# Patient Record
Sex: MALE | URBAN - METROPOLITAN AREA
[De-identification: names, ages, dates, MRNs, and addresses within clinical notes are randomized per-mention and may not be internally consistent; named-entity substitution may affect disease eponyms.]

---

## 2020-12-22 ENCOUNTER — INPATIENT (INPATIENT)
Facility: HOSPITAL | Age: 45
LOS: 0 days | Discharge: HOME | DRG: 287 | End: 2020-12-23
Attending: FAMILY MEDICINE | Admitting: INTERNAL MEDICINE
Payer: SELF-PAY

## 2020-12-22 VITALS — WEIGHT: 240.08 LBS | HEIGHT: 76 IN

## 2020-12-22 DIAGNOSIS — I21.3 ST ELEVATION (STEMI) MYOCARDIAL INFARCTION OF UNSPECIFIED SITE: ICD-10-CM

## 2020-12-22 DIAGNOSIS — R94.31 ABNORMAL ELECTROCARDIOGRAM [ECG] [EKG]: ICD-10-CM

## 2020-12-22 LAB
ADD ON TEST-SPECIMEN IN LAB: SIGNIFICANT CHANGE UP
ALBUMIN SERPL ELPH-MCNC: 3.8 G/DL — SIGNIFICANT CHANGE UP (ref 3.3–5)
ALP SERPL-CCNC: 68 U/L — SIGNIFICANT CHANGE UP (ref 40–120)
ALT FLD-CCNC: 26 U/L — SIGNIFICANT CHANGE UP (ref 12–78)
ANION GAP SERPL CALC-SCNC: 6 MMOL/L — SIGNIFICANT CHANGE UP (ref 5–17)
AST SERPL-CCNC: 17 U/L — SIGNIFICANT CHANGE UP (ref 15–37)
BASOPHILS # BLD AUTO: 0.04 K/UL — SIGNIFICANT CHANGE UP (ref 0–0.2)
BASOPHILS NFR BLD AUTO: 0.5 % — SIGNIFICANT CHANGE UP (ref 0–2)
BILIRUB SERPL-MCNC: 0.7 MG/DL — SIGNIFICANT CHANGE UP (ref 0.2–1.2)
BUN SERPL-MCNC: 14 MG/DL — SIGNIFICANT CHANGE UP (ref 7–23)
CALCIUM SERPL-MCNC: 9.1 MG/DL — SIGNIFICANT CHANGE UP (ref 8.5–10.1)
CHLORIDE SERPL-SCNC: 106 MMOL/L — SIGNIFICANT CHANGE UP (ref 96–108)
CO2 SERPL-SCNC: 27 MMOL/L — SIGNIFICANT CHANGE UP (ref 22–31)
CREAT SERPL-MCNC: 1.46 MG/DL — HIGH (ref 0.5–1.3)
D DIMER BLD IA.RAPID-MCNC: <150 NG/ML DDU — SIGNIFICANT CHANGE UP
EOSINOPHIL # BLD AUTO: 0.1 K/UL — SIGNIFICANT CHANGE UP (ref 0–0.5)
EOSINOPHIL NFR BLD AUTO: 1.1 % — SIGNIFICANT CHANGE UP (ref 0–6)
GLUCOSE SERPL-MCNC: 87 MG/DL — SIGNIFICANT CHANGE UP (ref 70–99)
HCT VFR BLD CALC: 45.1 % — SIGNIFICANT CHANGE UP (ref 39–50)
HGB BLD-MCNC: 15.4 G/DL — SIGNIFICANT CHANGE UP (ref 13–17)
IMM GRANULOCYTES NFR BLD AUTO: 0.3 % — SIGNIFICANT CHANGE UP (ref 0–1.5)
LIDOCAIN IGE QN: 181 U/L — SIGNIFICANT CHANGE UP (ref 73–393)
LYMPHOCYTES # BLD AUTO: 3.48 K/UL — HIGH (ref 1–3.3)
LYMPHOCYTES # BLD AUTO: 39.3 % — SIGNIFICANT CHANGE UP (ref 13–44)
MAGNESIUM SERPL-MCNC: 2.2 MG/DL — SIGNIFICANT CHANGE UP (ref 1.6–2.6)
MCHC RBC-ENTMCNC: 25.4 PG — LOW (ref 27–34)
MCHC RBC-ENTMCNC: 34.1 GM/DL — SIGNIFICANT CHANGE UP (ref 32–36)
MCV RBC AUTO: 74.3 FL — LOW (ref 80–100)
MONOCYTES # BLD AUTO: 0.88 K/UL — SIGNIFICANT CHANGE UP (ref 0–0.9)
MONOCYTES NFR BLD AUTO: 9.9 % — SIGNIFICANT CHANGE UP (ref 2–14)
NEUTROPHILS # BLD AUTO: 4.32 K/UL — SIGNIFICANT CHANGE UP (ref 1.8–7.4)
NEUTROPHILS NFR BLD AUTO: 48.9 % — SIGNIFICANT CHANGE UP (ref 43–77)
NT-PROBNP SERPL-SCNC: 30 PG/ML — SIGNIFICANT CHANGE UP (ref 0–125)
PLATELET # BLD AUTO: 147 K/UL — LOW (ref 150–400)
POTASSIUM SERPL-MCNC: 3.7 MMOL/L — SIGNIFICANT CHANGE UP (ref 3.5–5.3)
POTASSIUM SERPL-SCNC: 3.7 MMOL/L — SIGNIFICANT CHANGE UP (ref 3.5–5.3)
PROT SERPL-MCNC: 8.1 GM/DL — SIGNIFICANT CHANGE UP (ref 6–8.3)
RBC # BLD: 6.07 M/UL — HIGH (ref 4.2–5.8)
RBC # FLD: 13.5 % — SIGNIFICANT CHANGE UP (ref 10.3–14.5)
SARS-COV-2 RNA SPEC QL NAA+PROBE: SIGNIFICANT CHANGE UP
SODIUM SERPL-SCNC: 139 MMOL/L — SIGNIFICANT CHANGE UP (ref 135–145)
TROPONIN I SERPL-MCNC: <0.015 NG/ML — SIGNIFICANT CHANGE UP (ref 0.01–0.04)
WBC # BLD: 8.85 K/UL — SIGNIFICANT CHANGE UP (ref 3.8–10.5)
WBC # FLD AUTO: 8.85 K/UL — SIGNIFICANT CHANGE UP (ref 3.8–10.5)

## 2020-12-22 PROCEDURE — 71045 X-RAY EXAM CHEST 1 VIEW: CPT

## 2020-12-22 PROCEDURE — 80061 LIPID PANEL: CPT

## 2020-12-22 PROCEDURE — 71045 X-RAY EXAM CHEST 1 VIEW: CPT | Mod: 26

## 2020-12-22 PROCEDURE — 84484 ASSAY OF TROPONIN QUANT: CPT

## 2020-12-22 PROCEDURE — 85379 FIBRIN DEGRADATION QUANT: CPT

## 2020-12-22 PROCEDURE — 93010 ELECTROCARDIOGRAM REPORT: CPT | Mod: 76

## 2020-12-22 PROCEDURE — 86769 SARS-COV-2 COVID-19 ANTIBODY: CPT

## 2020-12-22 PROCEDURE — 99223 1ST HOSP IP/OBS HIGH 75: CPT

## 2020-12-22 PROCEDURE — 93567 NJX CAR CTH SPRVLV AORTGRPHY: CPT

## 2020-12-22 PROCEDURE — 93458 L HRT ARTERY/VENTRICLE ANGIO: CPT | Mod: 26

## 2020-12-22 PROCEDURE — 93306 TTE W/DOPPLER COMPLETE: CPT

## 2020-12-22 PROCEDURE — 80048 BASIC METABOLIC PNL TOTAL CA: CPT

## 2020-12-22 PROCEDURE — 36415 COLL VENOUS BLD VENIPUNCTURE: CPT

## 2020-12-22 PROCEDURE — 76700 US EXAM ABDOM COMPLETE: CPT

## 2020-12-22 PROCEDURE — 85025 COMPLETE CBC W/AUTO DIFF WBC: CPT

## 2020-12-22 PROCEDURE — 99223 1ST HOSP IP/OBS HIGH 75: CPT | Mod: 25

## 2020-12-22 PROCEDURE — 93306 TTE W/DOPPLER COMPLETE: CPT | Mod: 26

## 2020-12-22 RX ORDER — METOPROLOL TARTRATE 50 MG
50 TABLET ORAL DAILY
Refills: 0 | Status: DISCONTINUED | OUTPATIENT
Start: 2020-12-22 | End: 2020-12-22

## 2020-12-22 RX ORDER — ENOXAPARIN SODIUM 100 MG/ML
40 INJECTION SUBCUTANEOUS DAILY
Refills: 0 | Status: DISCONTINUED | OUTPATIENT
Start: 2020-12-22 | End: 2020-12-23

## 2020-12-22 RX ORDER — ASPIRIN/CALCIUM CARB/MAGNESIUM 324 MG
324 TABLET ORAL ONCE
Refills: 0 | Status: COMPLETED | OUTPATIENT
Start: 2020-12-22 | End: 2020-12-22

## 2020-12-22 RX ORDER — CLOPIDOGREL BISULFATE 75 MG/1
300 TABLET, FILM COATED ORAL ONCE
Refills: 0 | Status: COMPLETED | OUTPATIENT
Start: 2020-12-22 | End: 2020-12-22

## 2020-12-22 RX ORDER — SODIUM CHLORIDE 9 MG/ML
1000 INJECTION, SOLUTION INTRAVENOUS
Refills: 0 | Status: DISCONTINUED | OUTPATIENT
Start: 2020-12-22 | End: 2020-12-22

## 2020-12-22 RX ORDER — METOPROLOL TARTRATE 50 MG
25 TABLET ORAL DAILY
Refills: 0 | Status: DISCONTINUED | OUTPATIENT
Start: 2020-12-22 | End: 2020-12-23

## 2020-12-22 RX ORDER — SODIUM CHLORIDE 9 MG/ML
1000 INJECTION INTRAMUSCULAR; INTRAVENOUS; SUBCUTANEOUS
Refills: 0 | Status: DISCONTINUED | OUTPATIENT
Start: 2020-12-22 | End: 2020-12-23

## 2020-12-22 RX ORDER — LIDOCAINE 4 G/100G
1 CREAM TOPICAL DAILY
Refills: 0 | Status: DISCONTINUED | OUTPATIENT
Start: 2020-12-22 | End: 2020-12-23

## 2020-12-22 RX ORDER — ONDANSETRON 8 MG/1
4 TABLET, FILM COATED ORAL EVERY 6 HOURS
Refills: 0 | Status: DISCONTINUED | OUTPATIENT
Start: 2020-12-22 | End: 2020-12-23

## 2020-12-22 RX ORDER — ACETAMINOPHEN 500 MG
650 TABLET ORAL EVERY 4 HOURS
Refills: 0 | Status: DISCONTINUED | OUTPATIENT
Start: 2020-12-22 | End: 2020-12-23

## 2020-12-22 RX ORDER — ASPIRIN/CALCIUM CARB/MAGNESIUM 324 MG
81 TABLET ORAL DAILY
Refills: 0 | Status: DISCONTINUED | OUTPATIENT
Start: 2020-12-22 | End: 2020-12-23

## 2020-12-22 RX ADMIN — Medication 25 MILLIGRAM(S): at 11:13

## 2020-12-22 RX ADMIN — LIDOCAINE 1 PATCH: 4 CREAM TOPICAL at 21:59

## 2020-12-22 RX ADMIN — CLOPIDOGREL BISULFATE 300 MILLIGRAM(S): 75 TABLET, FILM COATED ORAL at 08:50

## 2020-12-22 RX ADMIN — Medication 324 MILLIGRAM(S): at 08:32

## 2020-12-22 NOTE — CHART NOTE - NSCHARTNOTEFT_GEN_A_CORE
46 y/o male with no significant PMHx presented to ED c/o SOB x 1 day and shoulder pain. EKG concerning for anterior wall MI. CODE STEMI called. Patient urgently taken to cath lab.     -Consent obtained for cardiac catheterization w/ coronary angiogram and possible stent placement. Pt is competent, has capacity, and understands risks and benefits of procedure. Risks and benefits discussed. Risk discussed included, but not limited to MI, stroke, mortality, major bleeding, arrythmia, or infection. All questions answered       ASA class: I  Creatinine:   GFR:  Bleeding  Risk score: 44 y/o male with no significant PMHx presented to ED c/o SOB x 1 day and shoulder pain. EKG concerning for anterior wall MI. CODE STEMI called. Patient urgently taken to cath lab.     -Consent obtained for cardiac catheterization w/ coronary angiogram and possible stent placement. Pt is competent, has capacity, and understands risks and benefits of procedure. Risks and benefits discussed. Risk discussed included, but not limited to MI, stroke, mortality, major bleeding, arrythmia, or infection. All questions answered       ASA class: I  Creatinine: 1.49  GFR: 66  Bleeding  Risk score: 0.9%

## 2020-12-22 NOTE — H&P ADULT - ASSESSMENT
44 y/o male with no significant PMHx who presents to  with CC of right shoulder/ shoulder blade pain and SOB initially thought to have STEMI but now with negative CATH.      #Right Scapular/ Rib Pain:    Unclear etiology.    DDx includes ACS/ Pericarditis/ Dissection/ PE/ gallbladder disease/ musculoskeletal pain.    S/p CATH-- nonobstructive disease.    Check ECHO-- r/o pericarditis.    Check DDimer-- if elevated, t/c CTA chest.    Trend trop.    Check US abdomen to r/o gallbladder disease.  No RUQ pain on exam.    Follow.      #EKG abnormalities:    AS above.  F/u ECHO.  F/u DDimer.  R/o pericarditis.      #Elevated BP:    Suspect more related to stress/ pain.    Repeat now 110's/70's.    Started on low dose metoprolol.    Trend.      #DVT Proph:  Lovenox.   46 y/o male with no significant PMHx who presents to  with CC of right shoulder/ shoulder blade pain and SOB initially thought to have STEMI but now with negative CATH.      #Right Scapular/ Rib Pain:    Unclear etiology.    DDx includes ACS/ Pericarditis/ Dissection/ PE/ gallbladder disease/ musculoskeletal pain.    S/p CATH-- nonobstructive disease.    Check ECHO-- r/o pericarditis.    Check DDimer-- if elevated, t/c CTA chest.    Trend trop.    Check US abdomen to r/o gallbladder disease.  No RUQ pain on exam.    Follow.      #EKG abnormalities:    AS above.  F/u ECHO.  F/u DDimer.  R/o pericarditis.      #Elevated BP:    Suspect more related to stress/ pain.    Repeat now 110's/70's.    Started on low dose metoprolol.    Trend.      #ALEXANDER versus CKD:    Cr 1.4-- unclear baseline.    Hydrate and repeat in am.      #DVT Proph:  Lovenox.

## 2020-12-22 NOTE — H&P ADULT - HISTORY OF PRESENT ILLNESS
46 y/o male with no significant PMHx who presents to  with CC of right shoulder/ shoulder blade pain.  Patient notes sx started yesterday evening.  Patient was driving for about 1 hr when pain started.  Patient notes pain was also associated with SOB.  Pain is in the area of shoulder blade/ ribs/ back.  Patient did shovel snow this week but that was a few days ago.  He initially thought he pulled a muscle.  Pain persistent overnight and into this morning.  When pain did not improve, he came to the ER for evaluation.  In the ER, patient found to have EKG concerning for STEMI with ant ST elevation and T wave changes and was taken emergently to the CATH LAB.  In the CATH lab, patient found to have normal coronaries/ nonobstructive disease.        PAST MEDICAL & SURGICAL HISTORY:  None    FAMILY HISTORY:  Mother:  cervical cancer.    No family hx of HTN/ CAD/ MI.      Social History:    No tob/ etoh/ drug use.    .      Allergies:  No Known Allergies    MEDS:  none

## 2020-12-22 NOTE — PROGRESS NOTE ADULT - SUBJECTIVE AND OBJECTIVE BOX
Nurse Practitioner Progress note:     HPI: 46 y/o male with no significant PMHx presented to ED c/o SOB x 1 day and shoulder pain. EKG concerning for anterior wall MI. CODE STEMI called. Patient urgently taken to cath lab.         T(C): 37.3 HR: 88   BP: 140/100 )  RR: 16   SpO2: 98%    LABS: All Labs Reviewed:                        15.4   8.85  )-----------( 147      ( 22 Dec 2020 08:25 )             45.1     12-22    139  |  106  |  14  ----------------------------<  87  3.7   |  27  |  1.46<H>    Ca    9.1      22 Dec 2020 08:25  Mg     2.2     12-22    TPro  8.1  /  Alb  3.8  /  TBili  0.7  /  DBili  x   /  AST  17  /  ALT  26  /  AlkPhos  68  12-22      CARDIAC MARKERS ( 22 Dec 2020 08:25 )  <0.015 ng/mL / x     / x     / x     / x          PHYSICAL EXAM:  NEURO: Non-focal, AxOx3.  No neuro deficits NECK: Supple, No JVD, No LAD  CHEST/LUNG: Clear to auscultation bilaterally; No wheeze  HEART: s1 s2 Regular rate and rhythm; No murmurs, rubs, or gallops  ABDOMEN: Soft, Nontender, Nondistended; Bowel sounds present X 4 quadrants   EXTREMITIES:  2+ Peripheral Pulses, No clubbing, cyanosis, or edema   VASCULAR: Peripheral pulses palpable 2+ bilaterally  PROCEDURE SITE: RRA accessed; bad to be removed in 1 hour. Site is without hematoma or bleeding. Sensation and VINI intact. Distal pulses palpable 2+, capillary refill < 2 seconds. Patient denies pain, numbness, tingling, CP or SOB. Clean dry dressing applied     PROCEDURE RESULTS: ful report to follow     ASSESSMENT:  46 y/o male with no significant PMHx presented to ED c/o SOB x 1 day and shoulder pain.    s/p LHC revealing normal coronaries.     PLAN:  -VS, diet, activity as per post cath orders  -Encourage PO fluids  -Continue current medications; start Metoprolol ER 50mg daily- 1st given in cath lab, ASA 81mg daily to start tomorrow   -1/2 NS @ 75cc/hr x 8 hours   - ECHO stat to rule out pericarditis  -Plan of care discussed with patient and Dr. Dick   - can place on tele   - admitting hospitalist aware  -Post cath instructions reviewed, patient verbalizes and understands instructions  - rest of care per primary team            Nurse Practitioner Progress note:     HPI: 46 y/o male with no significant PMHx presented to ED c/o SOB x 1 day and shoulder pain. EKG concerning for anterior wall MI. CODE STEMI called. Patient urgently taken to cath lab.         T(C): 37.3 HR: 88   BP: 140/100 )  RR: 16   SpO2: 98%    LABS: All Labs Reviewed:                        15.4   8.85  )-----------( 147      ( 22 Dec 2020 08:25 )             45.1     12-22    139  |  106  |  14  ----------------------------<  87  3.7   |  27  |  1.46<H>    Ca    9.1      22 Dec 2020 08:25  Mg     2.2     12-22    TPro  8.1  /  Alb  3.8  /  TBili  0.7  /  DBili  x   /  AST  17  /  ALT  26  /  AlkPhos  68  12-22      CARDIAC MARKERS ( 22 Dec 2020 08:25 )  <0.015 ng/mL / x     / x     / x     / x          PHYSICAL EXAM:  NEURO: Non-focal, AxOx3.  No neuro deficits NECK: Supple, No JVD, No LAD  CHEST/LUNG: Clear to auscultation bilaterally; No wheeze  HEART: s1 s2 Regular rate and rhythm; No murmurs, rubs, or gallops  ABDOMEN: Soft, Nontender, Nondistended; Bowel sounds present X 4 quadrants   EXTREMITIES:  2+ Peripheral Pulses, No clubbing, cyanosis, or edema   VASCULAR: Peripheral pulses palpable 2+ bilaterally  PROCEDURE SITE: RRA accessed; bad to be removed in 1 hour. Site is without hematoma or bleeding. Sensation and VINI intact. Distal pulses palpable 2+, capillary refill < 2 seconds. Patient denies pain, numbness, tingling, CP or SOB. Clean dry dressing applied     PROCEDURE RESULTS: ful report to follow     ASSESSMENT:  46 y/o male with no significant PMHx presented to ED c/o SOB x 1 day and shoulder pain.    s/p LHC revealing normal coronaries.     PLAN:  -VS, diet, activity as per post cath orders  -Encourage PO fluids  -Continue current medications; start Metoprolol ER 25mg daily- 1st given in cath lab, ASA 81mg daily to start tomorrow   -1/2 NS @ 75cc/hr x 8 hours   - ECHO stat to rule out pericarditis  -Plan of care discussed with patient and Dr. Dick   - can place on tele   - admitting hospitalist aware  -Post cath instructions reviewed, patient verbalizes and understands instructions  - rest of care per primary team

## 2020-12-22 NOTE — ED PROVIDER NOTE - PHYSICAL EXAMINATION
Constitutional: NAD AAOx3  Eyes: PERRLA EOMI  Head: Normocephalic atraumatic  Mouth: MMM  Cardiac: regular rate normal peripheral pulses no LE swelling  Resp: Lungs CTAB  GI: Abd s/nt/nd  Neuro: CN2-12 intact  Skin: No rashes

## 2020-12-22 NOTE — ED PROVIDER NOTE - PROGRESS NOTE DETAILS
pt endorsed to Dr. Ibarra accepts. Chapito Goodman M.D., Attending Physician The patient is low risk for a diagnosis of pulmonary embolism as demonstrated by being negative by the PERC rule: they are younger than 50, the pulse is <100, the oxygen saturation on room air is >95%, they have no history of prior venous thromboembolic disease, they have not had any recent surgery or significant trauma within the last 4 weeks, they deny hemoptysis, they do not take exogenous estrogen, and they do not have unilateral leg swelling. spoke with Dr. Dick - advised to call a code stemi will come to ED now. Chapito Goodman M.D., Attending Physician Dr. Dick at bedside - requesting 300mg plavix - pt to go to cath lab. Chapito Goodman M.D., Attending Physician EKG concerning for STEMI - paged Dr. Mayelin gates PCI. Chapito Goodman M.D., Attending Physician Spoke with Dr. Dick - sent EKG to him awaiting call back. Chapito Goodman M.D., Attending Physician

## 2020-12-22 NOTE — ED PROVIDER NOTE - CLINICAL SUMMARY MEDICAL DECISION MAKING FREE TEXT BOX
45M no pmhx presents to the ED for shoulder pain. right shoulder started yesterday associated with mild sob. no fevers cough. mild cp. no abd pain. no sweating or nausea. no fhx cardiac disease. didn't take anything for symptoms. no leg swelling. exam non-focal. perc negative. ekg concerning for stemi - spoke with Dr. Dick pt to go to cath lab. low concern for dissection given story. Chapito Goodman M.D., Attending Physician

## 2020-12-22 NOTE — H&P ADULT - NSHPPHYSICALEXAM_GEN_ALL_CORE
PEx  T(C): 37.3 (12-22-20 @ 08:07), Max: 37.3 (12-22-20 @ 08:07)  HR: 72 (12-22-20 @ 12:30) (72 - 88)  BP: 124/67 (12-22-20 @ 12:30) (119/63 - 140/100)  RR: 16 (12-22-20 @ 12:30) (15 - 16)  SpO2: 98% (12-22-20 @ 12:30) (97% - 98%)    General:     Well appearing, well nourished in no distress, no identifying marks , scars, or tattoos.  Skin: no rash or prominent lesions  Head: normocephalic, atraumatic     Sinuses: non-tender  Nose: no external lesions, mucosa non-inflamed, septum and turbinates normal  Throat: no erythema, exudates or lesions.  Neck: Supple without lymphadenopathy. Thyroid no thyromegaly, no palpable thyroid nodules, no palpable nodules or masses, carotid arteries no bruits.   Breasts: No palpable masses or lesions.  Heart: RRR, no murmur or gallop.  Normal S1, S2.  No S3, S4.   Lungs: CTA bilaterally, no wheezes, rhonchi, rales.  Breathing unlabored.   Chest wall: Normal insp   Abdomen:  Soft, NT/ND, normal bowel sounds, no HSM, no masses.  No peritoneal signs.   Back: spine normal without deformity or tenderness.  Normal ROM   : Exam normal.  no inguinal hernias.  Extremities: No deformities, clubbing, cyanosis, or edema.  Musculoskeletal: Normal gait and station. No decreased range of motion, instability, atrophy or abnormal strength or tone in the head, neck, spine, ribs, pelvis or extremities.   Neurologic: CN 2-12 normal. Sensation to pain, touch and proprioception normal. DTRs normal in upper and lower extremities. No pathologic reflexes.  Motor normal.  Psychiatric: Oriented X3, intact recent and remote memory, judgement and insight, normal mood and affect.

## 2020-12-22 NOTE — H&P ADULT - NSHPREVIEWOFSYSTEMS_GEN_ALL_CORE
ROS:  General:  No fevers, chills, or unexplained weight loss  Skin: No rash or bothersome skin lesions  Musculoskeletal: No arthalgias, myalgias or joint swelling  Eyes: No visual changes or eye pain  Ears: No hearing loss , otorrhea or ear pain  Nose, Mouth, Throat: No nasal congestion, rhinorrhea, oral lesions, postnasal drip or sore throat  Cardio: pain in back/ ribs/ shoulder blade  Respiratory: sob as above  GI: No diarrhea, constipation, blood in stools, abdominal pain, vomiting or heartburn  : No urinary frequency, hematuria, incontinence, or dysuria  Neurologic: No headaches, parasthesias, confusion, dysarthria or gait instability  Psychiatric:  No anxiety or depression  Lymphatic:  No easy bruising, easy bleeding or swollen glands  Allergic: No itching, sneezing , watery eyes, clear rhinorrhea or recurrent infections

## 2020-12-22 NOTE — ED PROVIDER NOTE - OBJECTIVE STATEMENT
45M no pmhx presents to the ED for shoulder pain. right shoulder started yesterday associated with mild sob. no fevers cough. mild cp. no abd pain. no sweating or nausea. no fhx cardiac disease. didn't take anything for symptoms. no leg swelling.

## 2020-12-22 NOTE — PACU DISCHARGE NOTE - COMMENTS
Verbal report given to Ca on CICU.  Patient awake and stable.  Right wrist with no s/s of bleeding or hematoma.  Brought to CICU with jennifer and RN.

## 2020-12-22 NOTE — ED PROVIDER NOTE - CARE PLAN
Principal Discharge DX:	STEMI (ST elevation myocardial infarction)  Secondary Diagnosis:	Shortness of breath

## 2020-12-22 NOTE — ED ADULT NURSE NOTE - CHIEF COMPLAINT QUOTE
pt presents to ED with complaint of SOB and throbbing pain under right scapula. pt states pain started last night, but became worse throughout the night. no meds PTA.
Parent(s)

## 2020-12-22 NOTE — ED ADULT TRIAGE NOTE - CHIEF COMPLAINT QUOTE
pt presents to ED with complaint of SOB and throbbing pain under right scapula. pt states pain started last night, but became worse throughout the night. no meds PTA.

## 2020-12-22 NOTE — H&P ADULT - NSHPLABSRESULTS_GEN_ALL_CORE
15.4   8.85  )-----------( 147      ( 22 Dec 2020 08:25 )             45.1     12-22    139  |  106  |  14  ----------------------------<  87  3.7   |  27  |  1.46<H>    Ca    9.1      22 Dec 2020 08:25  Mg     2.2     12-22    TPro  8.1  /  Alb  3.8  /  TBili  0.7  /  DBili  x   /  AST  17  /  ALT  26  /  AlkPhos  68  12-22    EKG:  ST Elevations V1-V3 with associated T wave abn    < from: Xray Chest 1 View-PORTABLE IMMEDIATE (12.22.20 @ 11:41) >          INTERPRETATION:  AP chest on December 22, 2020 at 8:37 AM. Patient has chest pain.    COMPARISON: None available.    Heart is magnified by technique.    The lung fields and pleural surfaces are unremarkable.    IMPRESSION: Clear lungs.      < end of copied text >

## 2020-12-23 ENCOUNTER — TRANSCRIPTION ENCOUNTER (OUTPATIENT)
Age: 45
End: 2020-12-23

## 2020-12-23 VITALS
RESPIRATION RATE: 17 BRPM | DIASTOLIC BLOOD PRESSURE: 73 MMHG | SYSTOLIC BLOOD PRESSURE: 128 MMHG | HEART RATE: 65 BPM | OXYGEN SATURATION: 97 %

## 2020-12-23 LAB
ANION GAP SERPL CALC-SCNC: 4 MMOL/L — LOW (ref 5–17)
BASOPHILS # BLD AUTO: 0.03 K/UL — SIGNIFICANT CHANGE UP (ref 0–0.2)
BASOPHILS NFR BLD AUTO: 0.3 % — SIGNIFICANT CHANGE UP (ref 0–2)
BUN SERPL-MCNC: 13 MG/DL — SIGNIFICANT CHANGE UP (ref 7–23)
CALCIUM SERPL-MCNC: 9.1 MG/DL — SIGNIFICANT CHANGE UP (ref 8.5–10.1)
CHLORIDE SERPL-SCNC: 109 MMOL/L — HIGH (ref 96–108)
CHOLEST SERPL-MCNC: 174 MG/DL — SIGNIFICANT CHANGE UP
CO2 SERPL-SCNC: 27 MMOL/L — SIGNIFICANT CHANGE UP (ref 22–31)
CREAT SERPL-MCNC: 1.22 MG/DL — SIGNIFICANT CHANGE UP (ref 0.5–1.3)
EOSINOPHIL # BLD AUTO: 0.09 K/UL — SIGNIFICANT CHANGE UP (ref 0–0.5)
EOSINOPHIL NFR BLD AUTO: 0.9 % — SIGNIFICANT CHANGE UP (ref 0–6)
GLUCOSE SERPL-MCNC: 91 MG/DL — SIGNIFICANT CHANGE UP (ref 70–99)
HCT VFR BLD CALC: 45.5 % — SIGNIFICANT CHANGE UP (ref 39–50)
HDLC SERPL-MCNC: 51 MG/DL — SIGNIFICANT CHANGE UP
HGB BLD-MCNC: 15.4 G/DL — SIGNIFICANT CHANGE UP (ref 13–17)
IMM GRANULOCYTES NFR BLD AUTO: 0.3 % — SIGNIFICANT CHANGE UP (ref 0–1.5)
LIPID PNL WITH DIRECT LDL SERPL: 113 MG/DL — HIGH
LYMPHOCYTES # BLD AUTO: 3.39 K/UL — HIGH (ref 1–3.3)
LYMPHOCYTES # BLD AUTO: 35.2 % — SIGNIFICANT CHANGE UP (ref 13–44)
MCHC RBC-ENTMCNC: 25.5 PG — LOW (ref 27–34)
MCHC RBC-ENTMCNC: 33.8 GM/DL — SIGNIFICANT CHANGE UP (ref 32–36)
MCV RBC AUTO: 75.2 FL — LOW (ref 80–100)
MONOCYTES # BLD AUTO: 0.9 K/UL — SIGNIFICANT CHANGE UP (ref 0–0.9)
MONOCYTES NFR BLD AUTO: 9.4 % — SIGNIFICANT CHANGE UP (ref 2–14)
NEUTROPHILS # BLD AUTO: 5.18 K/UL — SIGNIFICANT CHANGE UP (ref 1.8–7.4)
NEUTROPHILS NFR BLD AUTO: 53.9 % — SIGNIFICANT CHANGE UP (ref 43–77)
NON HDL CHOLESTEROL: 123 MG/DL — SIGNIFICANT CHANGE UP
PLATELET # BLD AUTO: 149 K/UL — LOW (ref 150–400)
POTASSIUM SERPL-MCNC: 4.3 MMOL/L — SIGNIFICANT CHANGE UP (ref 3.5–5.3)
POTASSIUM SERPL-SCNC: 4.3 MMOL/L — SIGNIFICANT CHANGE UP (ref 3.5–5.3)
RBC # BLD: 6.05 M/UL — HIGH (ref 4.2–5.8)
RBC # FLD: 13.5 % — SIGNIFICANT CHANGE UP (ref 10.3–14.5)
SODIUM SERPL-SCNC: 140 MMOL/L — SIGNIFICANT CHANGE UP (ref 135–145)
TRIGL SERPL-MCNC: 50 MG/DL — SIGNIFICANT CHANGE UP
WBC # BLD: 9.62 K/UL — SIGNIFICANT CHANGE UP (ref 3.8–10.5)
WBC # FLD AUTO: 9.62 K/UL — SIGNIFICANT CHANGE UP (ref 3.8–10.5)

## 2020-12-23 PROCEDURE — 99239 HOSP IP/OBS DSCHRG MGMT >30: CPT

## 2020-12-23 PROCEDURE — 99232 SBSQ HOSP IP/OBS MODERATE 35: CPT

## 2020-12-23 PROCEDURE — 76700 US EXAM ABDOM COMPLETE: CPT | Mod: 26

## 2020-12-23 RX ORDER — ACETAMINOPHEN 500 MG
2 TABLET ORAL
Qty: 0 | Refills: 0 | DISCHARGE
Start: 2020-12-23

## 2020-12-23 RX ADMIN — Medication 81 MILLIGRAM(S): at 10:16

## 2020-12-23 RX ADMIN — LIDOCAINE 1 PATCH: 4 CREAM TOPICAL at 10:30

## 2020-12-23 RX ADMIN — LIDOCAINE 1 PATCH: 4 CREAM TOPICAL at 10:16

## 2020-12-23 RX ADMIN — LIDOCAINE 1 PATCH: 4 CREAM TOPICAL at 07:30

## 2020-12-23 NOTE — PROGRESS NOTE ADULT - SUBJECTIVE AND OBJECTIVE BOX
HPI: 45 year old  Male with No Past Medical HX and No risk factors for CAD.  Last evening had sudden onset of right shoulder pain and SOB that persisted to this AM and came in for evaluation.  ECG Showed ST Elevation V1-V3 with associated T wave inversion V4-V6 and ST depressions in II, III and AVF.  Exercises regularly without issues.  No hx. of HTn, HL, DM, and non-smoker with no family hx. of CAD.     2020: s/p cardiac catheterization yesterday which showed minimal luminal irregularities.  LV FX normal. Feels much better today.  pain resolved.  BP better on metoprolol.        PAST MEDICAL & SURGICAL HISTORY:  No pertinent past medical history  No significant past surgical history      SOCIAL HISTORY: Non-Smoker/Social ETOH/ No [employer] Drug use.    FAMILY HISTORY:  No heart disease in Family.    Allergies  No Known Allergies    Home Medications:  None    MEDICATIONS  (STANDING):  aspirin  chewable 81 milliGRAM(s) Oral daily  enoxaparin Injectable 40 milliGRAM(s) SubCutaneous daily  lidocaine   Patch 1 Patch Transdermal daily  metoprolol succinate ER 25 milliGRAM(s) Oral daily  sodium chloride 0.9%. 1000 milliLiter(s) (75 mL/Hr) IV Continuous <Continuous>    MEDICATIONS  (PRN):  acetaminophen   Tablet .. 650 milliGRAM(s) Oral every 4 hours PRN Mild Pain (1 - 3)  aluminum hydroxide/magnesium hydroxide/simethicone Suspension 30 milliLiter(s) Oral every 4 hours PRN Dyspepsia  ondansetron Injectable 4 milliGRAM(s) IV Push every 6 hours PRN Nausea      Vital Signs Last 24 Hrs  T(C): 36.1 (23 Dec 2020 06:00), Max: 37.3 (22 Dec 2020 08:07)  T(F): 97 (23 Dec 2020 06:00), Max: 99.2 (22 Dec 2020 08:07)  HR: 60 (23 Dec 2020 06:00) (54 - 88)  BP: 120/65 (23 Dec 2020 03:00) (109/75 - 140/100)  BP(mean): 77 (23 Dec 2020 03:00) (75 - 100)  RR: 14 (22 Dec 2020 16:00) (13 - 16)  SpO2: 97% (23 Dec 2020 06:00) (96% - 99%)    I&O's Detail    22 Dec 2020 07:01  -  23 Dec 2020 07:00  --------------------------------------------------------  IN:    sodium chloride 0.9%: 700 mL  Total IN: 700 mL    OUT:  Total OUT: 0 mL    Total NET: 700 mL    Daily Height in cm: 193.04 (22 Dec 2020 08:06)    Daily Weight in k.4 (23 Dec 2020 06:00)    PHYSICAL EXAM:    Constitutional: NAD, awake and alert, well-developed  HEENT: PERRLA, EOMI,  No oral cyanosis. Oropharynx Clean and Dry.  Neck:  supple,  No JVD, No Thyroid enlargement. No Carotid Bruits bilaterally.  Respiratory: Breath sounds are clear bilaterally, No wheezing, rales or rhonchi  Cardiovascular: NL S1 and S2, RRR, no Murmurs, gallops or rubs  Gastrointestinal: Bowel Sounds present, soft.   Extremities: No peripheral edema. No clubbing or cyanosis.   Vascular: 2+ peripheral pulses in LE   Neurological: A/O x 3, no focal motor deficits  Musculoskeletal: no calf tenderness.  Skin: No rashes.    LABS: All Labs Reviewed:                        15.4   9.62  )-----------( 149      ( 23 Dec 2020 06:34 )             45.5     12-23    140  |  109<H>  |  13  ----------------------------<  91  4.3   |  27  |  1.22    Ca    9.1      23 Dec 2020 06:34  Mg     2.2     12-    TPro  8.1  /  Alb  3.8  /  TBili  0.7  /  DBili  x   /  AST  17  /  ALT  26  /  AlkPhos  68  12-22    CARDIAC MARKERS ( 22 Dec 2020 18:26 )  <0.015 ng/mL / x     / x     / x     / x      CARDIAC MARKERS ( 22 Dec 2020 14:18 )  <0.015 ng/mL / x     / x     / x     / x      CARDIAC MARKERS ( 22 Dec 2020 08:25 )  <0.015 ng/mL / x     / x     / x     / x          LIVER FUNCTIONS - ( 22 Dec 2020 08:25 )  Alb: 3.8 g/dL / Pro: 8.1 gm/dL / ALK PHOS: 68 U/L / ALT: 26 U/L / AST: 17 U/L / GGT: x             12-22 Chol 177 LDL -- HDL 54 Trig 48    EKG NSR, ST elevation V1-V3 and T wave inversion V4-V6 ans ST depression II, III, AVF            RADIOLOGY:  DELFINA, NL sized Heart.    echo  < from: TTE Echo Complete w/o Contrast w/ Doppler (20 @ 09:59) >   Summary     Trace mitral regurgitation is present.   Normal aortic valve structure and function.   Normal appearing tricuspid valve structure and function.   Trace pulmonic valvular regurgitation is present.   The left atrium is mildly dilated.   The left ventricle has normal wall motion and contractility.   Mild concentric left ventricular hypertrophy is present.   Estimated left ventricular ejection fraction is 60-65 %.   Normal appearing right atrium.   Normal appearing right ventricle structure and function.   No evidence of pericardial effusion.    < end of copied text >  < from: Cardiac Cath Lab - Adult (20 @ 08:57) >  Angiographic Findings     Cardiac Arteries and Lesion Findings    LMCA: Diffuse irregularity.There are minimal irregularities noted.    LAD: Diffuse irregularity.There are minimal irregularities noted.    LCx: Diffuse irregularity.There are minimal irregularities noted.    RCA: Diffuse irregularity.There are minimal irregularities noted.    Peripheral Arteries and Lesion Findings  Ascending aorta: Normal.No dissection noted.    Valves  +------+----------------------------+----------------------------+---------+  !Valve !Stenosis                    !Insufficiency               !Comments !  +------+----------------------------+----------------------------+---------+  !Aortic!No                          !No insufficiency            !         !  +------+----------------------------+----------------------------+---------+  !Mitral!Not assessed                !No insufficiency            !         !  +------+----------------------------+----------------------------+---------+    LV function assessed as:Normal.  Ejection Fraction  +----------------------------------------------------------------------+---+  !Method                                                                !EF%!  +----------------------------------------------------------------------+---+  !LV gram                                                               !60 !  +----------------------------------------------------------------------+---+    VA  Ventriculography Findings:  Normal left ventricular systolic function.     Impression     Diagnostic Conclusions     Minimal luminal irregularities in coronary arteries   Normal Left ventricular function    < end of copied text >

## 2020-12-23 NOTE — DISCHARGE NOTE NURSING/CASE MANAGEMENT/SOCIAL WORK - PATIENT PORTAL LINK FT
You can access the FollowMyHealth Patient Portal offered by [facility] by registering at the following website: [email or web address]. By joining [employer]’s FollowMyHealth portal, you will also be able to view your health information using other applications (apps) compatible with our system.

## 2020-12-23 NOTE — PROGRESS NOTE ADULT - SUBJECTIVE AND OBJECTIVE BOX
CC: right shoulder pain/ shoulder blade pain (23 Dec 2020 07:35)    HPI: 46 y/o male with no significant PMHx who presents to  with CC of right shoulder/ shoulder blade pain.  Patient notes sx started yesterday evening.  Patient was driving for about 1 hr when pain started.  Patient notes pain was also associated with SOB.  Pain is in the area of shoulder blade/ ribs/ back.  Patient did shovel snow this week but that was a few days ago.  He initially thought he pulled a muscle.  Pain persistent overnight and into this morning.  When pain did not improve, he came to the ER for evaluation.  In the ER, patient found to have EKG concerning for STEMI with ant ST elevation and T wave changes and was taken emergently to the CATH LAB.  In the CATH lab, patient found to have normal coronaries/ nonobstructive disease.      INTERVAL HPI/OVERNIGHT EVENTS: right back pain resolved, no other complaints  Other ROS reviewed and neg     Vital Signs Last 24 Hrs  T(C): 36.3 (23 Dec 2020 08:16), Max: 36.9 (22 Dec 2020 20:03)  T(F): 97.4 (23 Dec 2020 08:16), Max: 98.5 (22 Dec 2020 20:03)  HR: 74 (23 Dec 2020 11:00) (54 - 76)  BP: 129/70 (23 Dec 2020 11:00) (109/75 - 138/81)  BP(mean): 82 (23 Dec 2020 11:00) (75 - 95)  RR: 16 (23 Dec 2020 11:00) (13 - 17)  SpO2: 99% (23 Dec 2020 11:00) (96% - 100%)  I&O's Detail    22 Dec 2020 07:01  -  23 Dec 2020 07:00  --------------------------------------------------------  IN:    sodium chloride 0.9%: 700 mL  Total IN: 700 mL    OUT:  Total OUT: 0 mL    Total NET: 700 mL    CARDIAC MARKERS ( 22 Dec 2020 18:26 )  <0.015 ng/mL / x     / x     / x     / x      CARDIAC MARKERS ( 22 Dec 2020 14:18 )  <0.015 ng/mL / x     / x     / x     / x      CARDIAC MARKERS ( 22 Dec 2020 08:25 )  <0.015 ng/mL / x     / x     / x     / x                            15.4   9.62  )-----------( 149      ( 23 Dec 2020 06:34 )             45.5     23 Dec 2020 06:34    140    |  109    |  13     ----------------------------<  91     4.3     |  27     |  1.22     Ca    9.1        23 Dec 2020 06:34  Mg     2.2       22 Dec 2020 08:25    TPro  8.1    /  Alb  3.8    /  TBili  0.7    /  DBili  x      /  AST  17     /  ALT  26     /  AlkPhos  68     22 Dec 2020 08:25    LIVER FUNCTIONS - ( 22 Dec 2020 08:25 )  Alb: 3.8 g/dL / Pro: 8.1 gm/dL / ALK PHOS: 68 U/L / ALT: 26 U/L / AST: 17 U/L / GGT: x           MEDICATIONS  (STANDING):  aspirin  chewable 81 milliGRAM(s) Oral daily  enoxaparin Injectable 40 milliGRAM(s) SubCutaneous daily  lidocaine   Patch 1 Patch Transdermal daily  metoprolol succinate ER 25 milliGRAM(s) Oral daily  sodium chloride 0.9%. 1000 milliLiter(s) (75 mL/Hr) IV Continuous <Continuous>    MEDICATIONS  (PRN):  acetaminophen   Tablet .. 650 milliGRAM(s) Oral every 4 hours PRN Mild Pain (1 - 3)  aluminum hydroxide/magnesium hydroxide/simethicone Suspension 30 milliLiter(s) Oral every 4 hours PRN Dyspepsia  ondansetron Injectable 4 milliGRAM(s) IV Push every 6 hours PRN Nausea    RADIOLOGY & ADDITIONAL TESTS: personally visualized    PHYSICAL EXAM:    General: Well developed; well nourished; in no acute distress  Eyes: PERRLA, EOMI; conjunctiva and sclera clear  Head: Normocephalic; atraumatic  ENMT: No nasal discharge; airway clear  Neck: Supple; non tender; no masses  Respiratory: No wheezes, rales or rhonchi  Cardiovascular: Regular rate and rhythm. S1 and S2  Gastrointestinal: Soft non-tender non-distended; Normal bowel sounds  Genitourinary: No costovertebral angle tenderness  Extremities: No clubbing, cyanosis or edema  Vascular: Peripheral pulses palpable 2+ bilaterally  Neurological: Alert and oriented x4  Skin: Warm and dry. No acute rash  Lymph Nodes: No acute cervical adenopathy  Musculoskeletal: Normal gait, tone, without deformities  Psychiatric: Cooperative and appropriate

## 2020-12-23 NOTE — DISCHARGE NOTE PROVIDER - CARE PROVIDER_API CALL
Keyshawn Dick  CARDIOVASCULAR DISEASE  [address] [place] [address]  Phone: [phone]  Fax: [phone]  Follow Up Time:

## 2020-12-23 NOTE — PROGRESS NOTE ADULT - ASSESSMENT
45 y.o. male with no PMHx p/w right back pain after cleaning up the snow, found to have non-specific EKG changes and underwent cardiac cath with no abnormalities found. Abd sonogram with no acute abnormalities.    Pt is medically stable for DC  Discussed with cardiology

## 2020-12-23 NOTE — PROGRESS NOTE ADULT - PROBLEM SELECTOR PLAN 1
Presentation not c/w STEMI and angiogram confirmed this yesterday.  Cardiac enzymes negative. echo without pericardial effusion and no LVH seen.  ECG abnormality not correlative with any particular finding. Symptoms resolved and BP controlled.  D-dimer negative.  Cardiology wise stable for discharge with outpatient follow up.  Having Abd US this am to work up RLQ pain from yesterday as per medicine.

## 2020-12-24 LAB
SARS-COV-2 IGG SERPL QL IA: POSITIVE
SARS-COV-2 IGM SERPL IA-ACNC: 11.8 INDEX — HIGH

## 2020-12-28 NOTE — ED PROVIDER NOTE - NS ED ROS FT
Addended by: CAROLINA ANDREWS on: 12/28/2020 11:01 AM     Modules accepted: Orders     Constitutional: No fever or chills  Eyes: No visual changes  HEENT: No throat pain  CV: + chest pain  Resp: + SOB no cough  GI: No abd pain, nausea or vomiting  : No dysuria  MSK: No musculoskeletal pain +shoulder pain  Skin: No rash  Neuro: No headache

## 2020-12-29 DIAGNOSIS — M62.830 MUSCLE SPASM OF BACK: ICD-10-CM

## 2020-12-29 DIAGNOSIS — I10 ESSENTIAL (PRIMARY) HYPERTENSION: ICD-10-CM

## 2020-12-29 DIAGNOSIS — K82.4 CHOLESTEROLOSIS OF GALLBLADDER: ICD-10-CM

## 2020-12-29 DIAGNOSIS — R07.89 OTHER CHEST PAIN: ICD-10-CM

## 2020-12-29 DIAGNOSIS — R06.02 SHORTNESS OF BREATH: ICD-10-CM
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
## 2020-12-29 DIAGNOSIS — N28.9 DISORDER OF KIDNEY AND URETER, UNSPECIFIED: ICD-10-CM

## 2020-12-29 DIAGNOSIS — R76.0 RAISED ANTIBODY TITER: ICD-10-CM
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
## 2022-12-20 ENCOUNTER — APPOINTMENT (OUTPATIENT)
Dept: OTOLARYNGOLOGY | Facility: CLINIC | Age: 47
End: 2022-12-20

## 2023-05-03 NOTE — DISCHARGE NOTE PROVIDER - NSDCCPCAREPLAN_GEN_ALL_CORE_FT
PRINCIPAL DISCHARGE DIAGNOSIS  Diagnosis: Abdominal pain with radiation to back  Assessment and Plan of Treatment: muscular spasm related      
no

## 2024-05-31 NOTE — ED PROVIDER NOTE - EKG #1 DATE/TIME
Pt called in to verify that clinic received his intake paperwork for upcoming appt with Yolie. Writer confirmed with pt that paperwork was received via email. Paperwork faxed to HIM and placed in Yolie's mailbox. Message sent to Yolie to make her aware.    Radha Paul on 5/31/2024 at 10:36 AM     22-Dec-2020 08:34